# Patient Record
Sex: FEMALE | Race: ASIAN
[De-identification: names, ages, dates, MRNs, and addresses within clinical notes are randomized per-mention and may not be internally consistent; named-entity substitution may affect disease eponyms.]

---

## 2019-07-24 ENCOUNTER — HOSPITAL ENCOUNTER (EMERGENCY)
Dept: HOSPITAL 12 - ER | Age: 24
Discharge: HOME | End: 2019-07-24
Payer: COMMERCIAL

## 2019-07-24 VITALS — WEIGHT: 130 LBS | HEIGHT: 62 IN | BODY MASS INDEX: 23.92 KG/M2

## 2019-07-24 VITALS — SYSTOLIC BLOOD PRESSURE: 106 MMHG | DIASTOLIC BLOOD PRESSURE: 77 MMHG

## 2019-07-24 DIAGNOSIS — J45.909: Primary | ICD-10-CM

## 2019-07-24 DIAGNOSIS — Z79.899: ICD-10-CM

## 2019-07-24 RX ADMIN — ALBUTEROL SULFATE ONE MG: 2.5 SOLUTION RESPIRATORY (INHALATION) at 15:18

## 2019-07-24 RX ADMIN — IPRATROPIUM BROMIDE ONE MG: 0.5 SOLUTION RESPIRATORY (INHALATION) at 15:18

## 2019-07-24 NOTE — NUR
PT STATES "I FEEL BETTER" POST BREATHING TX. NO SOB REPORTED. LUNG SOUNDS CLEAR 
IN ALL LUNG FIELDS.

## 2019-07-24 NOTE — NUR
PT IS IN ROOM #1B. DR VAZQUEZ EVALUATED THE PT. RESPIRATORY THERAPIST WAS CALLED 
TO ADMINISTER BREATHING TREATMENT ACCORDING TO DR VAZQUEZ ORDER.